# Patient Record
Sex: MALE | Race: WHITE | Employment: FULL TIME | URBAN - METROPOLITAN AREA
[De-identification: names, ages, dates, MRNs, and addresses within clinical notes are randomized per-mention and may not be internally consistent; named-entity substitution may affect disease eponyms.]

---

## 2018-01-17 ENCOUNTER — HOSPITAL ENCOUNTER (OUTPATIENT)
Dept: PHYSICAL THERAPY | Age: 53
Discharge: HOME | End: 2018-01-17
Payer: COMMERCIAL

## 2018-01-17 PROCEDURE — 97110 THERAPEUTIC EXERCISES: CPT

## 2018-01-17 PROCEDURE — 97161 PT EVAL LOW COMPLEX 20 MIN: CPT

## 2018-01-17 NOTE — PROGRESS NOTES
[facility]  [address] [address], [address] [place] [address], [address] [place] [address]  Phone: [phone]  Fax: [phone]    Plan of Care/Statement of Necessity for Physical Therapy Services  2-15    Patient name: Beltran Andres  : 1965  Provider#: [id number]  Referral source: David Yost,*      Medical/Treatment Diagnosis: Left foot pain [M79.672]     Prior Hospitalization: see medical history     Comorbidities: none noted  Prior Level of Function: works full-time in [employer]; completes 20 minutes of exercise at least 3x/week; runner  Medications: Verified on Patient Summary List    Start of Care: 2018     Onset Date: 2017       The Plan of Care and following information is based on the information from the initial evaluation. Assessment/ key information: Pt is a 50year old male who is referred to Physical Therapy by Dr. Roberto Serrato with a diagnosis of left plantar fasciitis. Pain started due to overuse from running in 2017. Pt instructed in lace locking for his running shoes to provide increased stability of calcaneus. Patient will benefit from skilled PT services to modify and progress therapeutic interventions, address functional mobility deficits, address ROM deficits, address strength deficits, analyze and address soft tissue restrictions, analyze and cue movement patterns, analyze and modify body mechanics/ergonomics, assess and modify postural abnormalities and address imbalance to attain pt/PT goals.     Evaluation Complexity History MEDIUM  Complexity : 1-2 comorbidities / personal factors will impact the outcome/ POC ; Examination HIGH Complexity : 4+ Standardized tests and measures addressing body structure, function, activity limitation and / or participation in recreation  ;Presentation LOW Complexity : Stable, uncomplicated  ;Clinical Decision Making MEDIUM Complexity : FOTO score of 26-74  Overall Complexity Rating: LOW     Problem List: pain affecting function, decrease ROM, decrease strength, impaired gait/ balance, decrease ADL/ functional abilitiies, decrease activity tolerance and decrease flexibility/ joint mobility   Treatment Plan may include any combination of the following: Therapeutic exercise, Therapeutic activities, Neuromuscular re-education, Physical agent/modality, Gait/balance training, Manual therapy and Patient education  Patient / Family readiness to learn indicated by: asking questions, trying to perform skills and interest  Persons(s) to be included in education: patient (P)  Barriers to Learning/Limitations: None  Patient Goal (s): Eliminate issue.   Patient Self Reported Health Status: good  Rehabilitation Potential: good    Short/Long Term Goals: To be accomplished in 6 weeks:  1) Pt will be Independent with HEP. 2) Pt will be able to navigate one flight of stairs without pain. 3) Pt will be able to jog >/=5 miles without increase in pain. 4) Pt will report improvement in overall functional mobility, as measured by FOTO, with an increased score of at least 15 points, from 66 to 81. Frequency / Duration: Patient to be seen 2 times per week for 6 weeks. Patient/ Caregiver education and instruction: self care, activity modification and exercises    [x]  Plan of care has been reviewed with KATHY Nam PT, DPT   1/17/2018 9:57 AM    ________________________________________________________________________    I certify that the above Therapy Services are being furnished while the patient is under my care. I agree with the treatment plan and certify that this therapy is necessary.     [facility] Signature:____________________  Date:____________Time: _________

## 2018-01-17 NOTE — PROGRESS NOTES
PT INITIAL EVALUATION NOTE - [facility] 2-15    Patient Name: Arnol Ríos  Date:2018  : 1965  [x]  Patient  Verified  Payor: Juan Diego Avendañoight / Plan: [employer] HMO / Product Type: HMO /    In time:10:00am  Out time:11:00pm  Total Treatment Time (min): 60  Total Timed Codes (min): 20  1:1 Treatment Time ( [address] only): --   Visit #: 1     Treatment Area: Left foot pain [M79.672]    SUBJECTIVE  Pain Level (0-10 scale): 0/10  Any medication changes, allergies to medications, adverse drug reactions, diagnosis change, or new procedure performed?: [] No    [x] Yes (see summary sheet for update)  Subjective:    Left heel pain started in 2017 when running. Had previous episode about 10 years ago   10K training program   Last run was on Saturday. Since pain started in December, he has limited his running to <5 miles, 3-4x/week  LBP- related to mountain biking a few years ago. Pt ran [place] last year and had flare up of bilateral hip pain. PLOF: no left heel pain  Mechanism of Injury: running  Previous Treatment/Compliance: ice massage, golf ball massage, rest, NSAID  PMHx/Surgical Hx: none noted  Work Hx: works full-time for [email or web address] in [address] [place] [address] Situation: lives with wife  Pt Goals: \"Eliminate issue. \"  Barriers: overuse  Motivation: high  Substance use: none  FABQ Score: low        OBJECTIVE/EXAMINATION  Posture:  Weight shifted to the right in standing  Gait and Functional Mobility:  Slight toe out during swing phase on the left  Palpation: tenderness to palpation left calcaneus    Left ankle ROM: AROM   PROM   DF  42   45   PF  48   50   IR/Sup/Ev 35   35   ER/Pron/Inv 20   23    Joint Mobility Assessment: left hypomobility posterior talus glide    Flexibility: tightness in left piriformis, gastrocnemius and soleus muscles    LOWER QUARTER   MUSCLE STRENGTH  KEY       R  L  0 - No Contraction  Knee ext  5/5  5/5  1 - Trace            flex  5/5  5/5  2 - Poor   Hip ext   5/5  5/5  3 - Fair          flex   5/5  5/5  4 - Good         abd  4/5  3+/5  5 - Normal         add  5/5  5/5      Ankle DF  5/5  5/5                PF  5/5  5/5                INV  5/5  5/5                EV  5/5  5/5    Neurological: Reflexes / Sensations: normal  Special Tests: Windlass, non-weightbearing: +     20 min Therapeutic Exercise:  [x] See flow sheet :   Rationale: increase ROM, increase strength and improve coordination to improve the patients ability to return to running without pain or limitation          With   [] TE   [] TA   [] neuro   [] other: Patient Education: [x] Review HEP- pt given handout with exercises for HEP    [] Progressed/Changed HEP based on:   [] positioning   [] body mechanics   [] transfers   [] heat/ice application    [x] other: pt instructed in lock lacing of running shoes      Other Objective/Functional Measures: FOTO: 66    Pain Level (0-10 scale) post treatment: 6/10    ASSESSMENT/Changes in Function:   Pt is a 50year old male who is referred to Physical Therapy by Dr. Alex Stanford with a diagnosis of left plantar fasciitis. Pain started due to overuse from running in December 2017. Pt instructed in lace locking for his running shoes to provide increased stability of calcaneus. Patient will benefit from skilled PT services to modify and progress therapeutic interventions, address functional mobility deficits, address ROM deficits, address strength deficits, analyze and address soft tissue restrictions, analyze and cue movement patterns, analyze and modify body mechanics/ergonomics, assess and modify postural abnormalities and address imbalance to attain pt/PT goals.     [x]  See Plan of [address] PT, DPT   1/17/2018  9:56 AM

## 2018-01-23 ENCOUNTER — HOSPITAL ENCOUNTER (OUTPATIENT)
Dept: PHYSICAL THERAPY | Age: 53
Discharge: HOME | End: 2018-01-23
Payer: COMMERCIAL

## 2018-01-23 PROCEDURE — 97035 APP MDLTY 1+ULTRASOUND EA 15: CPT | Performed by: PHYSICAL THERAPIST

## 2018-01-23 PROCEDURE — 97110 THERAPEUTIC EXERCISES: CPT | Performed by: PHYSICAL THERAPIST

## 2018-01-23 PROCEDURE — 97140 MANUAL THERAPY 1/> REGIONS: CPT | Performed by: PHYSICAL THERAPIST

## 2018-01-23 NOTE — PROGRESS NOTES
PT DAILY TREATMENT NOTE 2-15    Patient Name: Roxi Forbes  Date:2018  : 1965  [x]  Patient  Verified  Payor: Julius Lerma / Plan: [employer] / Product Type: HMO /    In time:8:00a  Out time: 9:15a  Total Treatment Time (min):75  Visit #: 2     Treatment Area: Left foot pain [M79.002]    SUBJECTIVE  Pain Level (0-10 scale): 4/10  Any medication changes, allergies to medications, adverse drug reactions, diagnosis change, or new procedure performed?: [x] No    [] Yes (see summary sheet for update)  Subjective functional status/changes:   [] No changes reported  Feeling about the same. OBJECTIVE    Modality rationale: decrease inflammation, decrease pain and increase tissue extensibility to improve the patients ability to return to running painfree.     Min Type Additional Details    [] Estim: []Att   []Unatt        []TENS instruct                  []IFC  []Premod   []NMES                     []Other:  []w/US   []w/ice   []w/heat  Position:  Location:    []  Traction: [] Cervical       []Lumbar                       [] Prone          []Supine                       []Intermittent   []Continuous Lbs:  [] before manual  [] after manual  []w/heat   8 [x]  Ultrasound: []Continuous   [x] Pulsed at:                            []1MHz   [x]3MHz Location: calcaneus  W/cm2:1.5    []  Paraffin         Location:  []w/heat   To go []  Ice     []  Heat  []  Ice massage Position:  Location:    []  Laser  []  Other: Position:  Location:    []  Vasopneumatic Device Pressure:       [] lo [] med [] hi   Temperature:    [x] Skin assessment post-treatment:  [x]intact []redness- no adverse reaction    []redness - adverse reaction:   30 min Therapeutic Exercise:  [x] See flow sheet :   Rationale: increase ROM, increase strength and improve coordination to improve the patients ability to return to running without pain or limitation        30 min Manual Therapy:  talocrural joint mobilizations, great toe mobilizations and passive stretching, STM to plantar fascia and gastroc soleus. Used IASTM on distal gastroc soleus and achilles. Rationale: decrease pain, increase ROM, increase tissue extensibility and decrease trigger points  to improve the patients ability to  return to running without pain or limitation      With   [] TE   [] TA   [] neuro   [] other: Patient Education: [x] Review HEP    [] Progressed/Changed HEP based on:   [] positioning   [] body mechanics   [] transfers   [] heat/ice application    [] other:      Other Objective/Functional Measures: DF 4 degrees on the left and 5 on the right  Hallux rigidis LUIS. Poor functional control with single leg squat with  Pelvic drop and medial collapse at knee. Soleus assessment 4 inches on the left and 4.5 inches on the right. Pain Level (0-10 scale) post treatment: not reported    ASSESSMENT/Changes in Function:   Very tight into gastroc and had blanching of skin immediately with IASTM distal gastroc along musculotendinous junction. Discussed stretching several times per day and icing as well. Patient will continue to benefit from skilled PT services to modify and progress therapeutic interventions, address functional mobility deficits, address ROM deficits, address strength deficits, analyze and address soft tissue restrictions, analyze and cue movement patterns, analyze and modify body mechanics/ergonomics and assess and modify postural abnormalities to attain remaining goals. [x]  See Plan of Care  []  See progress note/recertification  []  See Discharge Summary         Progress towards goals / Updated goals:  Short/Long Term Goals: To be accomplished in 6 weeks:  1) Pt will be Independent with HEP. 2) Pt will be able to navigate one flight of stairs without pain. 3) Pt will be able to jog >/=5 miles without increase in pain.   4) Pt will report improvement in overall functional mobility, as measured by FOTO, with an increased score of at least 15 points, from 66 to 81.     Frequency / Duration: Patient to be seen 2 times per week for 6 weeks.     PLAN  []  Upgrade activities as tolerated     [x]  Continue plan of care  []  Update interventions per flow sheet       []  Discharge due to:_  []  Other:_      Alisa Guerrero 1/23/2018  9:44 AM

## 2018-01-25 ENCOUNTER — HOSPITAL ENCOUNTER (OUTPATIENT)
Dept: PHYSICAL THERAPY | Age: 53
Discharge: HOME | End: 2018-01-25
Payer: COMMERCIAL

## 2018-01-25 PROCEDURE — 97140 MANUAL THERAPY 1/> REGIONS: CPT | Performed by: PHYSICAL THERAPIST

## 2018-01-25 PROCEDURE — 97110 THERAPEUTIC EXERCISES: CPT | Performed by: PHYSICAL THERAPIST

## 2018-01-25 PROCEDURE — 97035 APP MDLTY 1+ULTRASOUND EA 15: CPT | Performed by: PHYSICAL THERAPIST

## 2018-01-25 NOTE — PROGRESS NOTES
PT DAILY TREATMENT NOTE 2-15    Patient Name: Alla Murrieta  Date:2018  : 1965  [x]  Patient  Verified  Payor: Matthiaskaz Rosasas / Plan: [employer] / Product Type: HMO /    In time:7:30a  Out time:8:30a  Total Treatment Time (min): 60  Visit #: 3    Treatment Area: Left foot pain [M79.232]    SUBJECTIVE  Pain Level (0-10 scale): 1/10  Any medication changes, allergies to medications, adverse drug reactions, diagnosis change, or new procedure performed?: [x] No    [] Yes (see summary sheet for update)  Subjective functional status/changes:   [] No changes reported  Doing pretty well. OBJECTIVE           Modality rationale: decrease inflammation, decrease pain and increase tissue extensibility to improve the patients ability to return to running painfree.     Min Type Additional Details     [] Estim: []Att   []Unatt        []TENS instruct                  []IFC  []Premod   []NMES                     []Other:  []w/US   []w/ice   []w/heat  Position:  Location:     []  Traction: [] Cervical       []Lumbar                       [] Prone          []Supine                       []Intermittent   []Continuous Lbs:  [] before manual  [] after manual  []w/heat   8 [x]  Ultrasound: []Continuous   [x] Pulsed at:                            []1MHz   [x]3MHz Location: calcaneus  W/cm2:1.5     []  Paraffin     Location:  []w/heat   To go []  Ice     []  Heat  []  Ice massage Position:  Location:     []  Laser  []  Other: Position:  Location:     []  Vasopneumatic Device Pressure:       [] lo [] med [] hi   Temperature:    [x] Skin assessment post-treatment:  [x]intact []redness- no adverse reaction    []redness - adverse reaction:   30 min Therapeutic Exercise:  [x] See flow sheet :   Rationale: increase ROM, increase strength and improve coordination to improve the patients ability to return to running without pain or limitation         20 min Manual Therapy:  talocrural joint mobilizations, great toe mobilizations and passive stretching, STM to plantar fascia and gastroc soleus. Used IASTM on distal gastroc soleus and achilles. Rationale: decrease pain, increase ROM, increase tissue extensibility and decrease trigger points  to improve the patients ability to  return to running without pain or limitation        With   [] TE   [] TA   [] neuro   [] other: Patient Education: [x] Review HEP    [] Progressed/Changed HEP based on:   [] positioning   [] body mechanics   [] transfers   [] heat/ice application    [] other:       Other Objective/Functional Measures: DF 4 degrees on the left and 5 on the right  Hallux rigidis LUIS. Poor functional control with single leg squat with  Pelvic drop and medial collapse at knee. Soleus assessment 4 inches on the left and 4.5 inches on the right. Pain Level (0-10 scale) post treatment: not reported     ASSESSMENT/Changes in Function:   Overall feeling a little improvement in s/s since last session. Did tape again and feel like this is helping some. Less turgor along distal achilles and calf today  Patient will continue to benefit from skilled PT services to modify and progress therapeutic interventions, address functional mobility deficits, address ROM deficits, address strength deficits, analyze and address soft tissue restrictions, analyze and cue movement patterns, analyze and modify body mechanics/ergonomics and assess and modify postural abnormalities to attain remaining goals.      [x]  See Plan of Care  []  See progress note/recertification  []  See Discharge Summary      Progress towards goals / Updated goals:  Short/Long Term Goals: To be accomplished in 6 weeks:  1) Pt will be Independent with HEP. 2) Pt will be able to navigate one flight of stairs without pain. 3) Pt will be able to jog >/=5 miles without increase in pain.   4) Pt will report improvement in overall functional mobility, as measured by FOTO, with an increased score of at least 15 points, from 66 to 80.      Frequency / Duration: Patient to be seen 2 times per week for 6 weeks.     PLAN  []  Upgrade activities as tolerated     [x]  Continue plan of care  []  Update interventions per flow sheet       []  Discharge due to:_  []  Other:_      Lisa Middleton 1/25/2018  10:28 AM

## 2018-01-30 ENCOUNTER — HOSPITAL ENCOUNTER (OUTPATIENT)
Dept: PHYSICAL THERAPY | Age: 53
Discharge: HOME | End: 2018-01-30
Payer: COMMERCIAL

## 2018-01-30 PROCEDURE — 97140 MANUAL THERAPY 1/> REGIONS: CPT | Performed by: PHYSICAL THERAPIST

## 2018-01-30 PROCEDURE — 97110 THERAPEUTIC EXERCISES: CPT | Performed by: PHYSICAL THERAPIST

## 2018-01-30 PROCEDURE — 97035 APP MDLTY 1+ULTRASOUND EA 15: CPT | Performed by: PHYSICAL THERAPIST

## 2018-01-30 NOTE — PROGRESS NOTES
PT DAILY TREATMENT NOTE 2-15    Patient Name: Barry Schroeder  Date:2018  : 1965  [x]  Patient  Verified  Payor: Elizabeth Yas / Plan: [employer] / Product Type: HMO /    In time:7:00a  Out time:8:00a  Total Treatment Time (min): 60  Visit #: 4     Treatment Area: Left foot pain [M79.552]    SUBJECTIVE  Pain Level (0-10 scale): 2/10  Any medication changes, allergies to medications, adverse drug reactions, diagnosis change, or new procedure performed?: [x] No    [] Yes (see summary sheet for update)  Subjective functional status/changes:   [] No changes reported  Feeling  more sore this weekend doing projects around the house. OBJECTIVE                Modality rationale: decrease inflammation, decrease pain and increase tissue extensibility to improve the patients ability to return to running painfree.     Min Type Additional Details      [] Estim: []Att   []Unatt        []TENS instruct                  []IFC  []Premod   []NMES                     []Other:  []w/US   []w/ice   []w/heat  Position:  Location:      []  Traction: [] Cervical       []Lumbar                       [] Prone          []Supine                       []Intermittent   []Continuous Lbs:  [] before manual  [] after manual  []w/heat   8 [x]  Ultrasound: []Continuous   [x] Pulsed at:                            []1MHz   [x]3MHz Location: calcaneus  W/cm2:1.5      []  Paraffin     Location:  []w/heat   To go []  Ice     []  Heat  []  Ice massage Position:  Location:      []  Laser  []  Other: Position:  Location:      []  Vasopneumatic Device Pressure:       [] lo [] med [] hi   Temperature:    [x] Skin assessment post-treatment:  [x]intact []redness- no adverse reaction    []redness - adverse reaction:   30 min Therapeutic Exercise:  [x] See flow sheet :   Rationale: increase ROM, increase strength and improve coordination to improve the patients ability to return to running without pain or limitation          20 min Manual Therapy:  talocrural joint mobilizations, great toe mobilizations and passive stretching, STM to plantar fascia and gastroc soleus. Used IASTM on distal gastroc soleus and achilles. Rationale: decrease pain, increase ROM, increase tissue extensibility and decrease trigger points  to improve the patients ability to  return to running without pain or limitation          With   [] TE   [] TA   [] neuro   [] other: Patient Education: [x] Review HEP    [] Progressed/Changed HEP based on:   [] positioning   [] body mechanics   [] transfers   [] heat/ice application    [] other:        Other Objective/Functional Measures: DF 4 degrees on the left and 5 on the right  Hallux rigidis LUIS. Poor functional control with single leg squat with  Pelvic drop and medial collapse at knee. Soleus assessment 4 inches on the left and 4.5 inches on the right.      Pain Level (0-10 scale) post treatment: not reported      ASSESSMENT/Changes in Function:   Feeling a little sore after being up on his feet doing projects around the house all weekend. Patient will continue to benefit from skilled PT services to modify and progress therapeutic interventions, address functional mobility deficits, address ROM deficits, address strength deficits, analyze and address soft tissue restrictions, analyze and cue movement patterns, analyze and modify body mechanics/ergonomics and assess and modify postural abnormalities to attain remaining goals.      [x]  See Plan of Care  []  See progress note/recertification  []  See Discharge Summary      Progress towards goals / Updated goals:  Short/Long Term Goals: To be accomplished in 6 weeks:  1) Pt will be Independent with HEP. 2) Pt will be able to navigate one flight of stairs without pain. 3) Pt will be able to jog >/=5 miles without increase in pain.   4) Pt will report improvement in overall functional mobility, as measured by FOTO, with an increased score of at least 15 points, from 66 to 80.      Frequency / Duration: Patient to be seen 2 times per week for 6 weeks.      PLAN  []  Upgrade activities as tolerated     [x]  Continue plan of care  []  Update interventions per flow sheet       []  Discharge due to:_  []  Other:_      Brendan Peng 1/30/2018  1:46 PM

## 2018-02-01 ENCOUNTER — HOSPITAL ENCOUNTER (OUTPATIENT)
Dept: PHYSICAL THERAPY | Age: 53
Discharge: HOME | End: 2018-02-01
Payer: COMMERCIAL

## 2018-02-01 PROCEDURE — 97140 MANUAL THERAPY 1/> REGIONS: CPT | Performed by: PHYSICAL THERAPIST

## 2018-02-01 PROCEDURE — 97035 APP MDLTY 1+ULTRASOUND EA 15: CPT | Performed by: PHYSICAL THERAPIST

## 2018-02-01 PROCEDURE — 97110 THERAPEUTIC EXERCISES: CPT | Performed by: PHYSICAL THERAPIST

## 2018-02-01 NOTE — PROGRESS NOTES
PT DAILY TREATMENT NOTE 2-15    Patient Name: Caesar Guadalupe  Date:2018  : 1965  [x]  Patient  Verified  Payor: Chavo La / Plan: [employer] / Product Type: HMO /    In time: 7:30a  Out time:8:45a  Total Treatment Time (min): 75  Visit #:5     Treatment Area: Left foot pain [M79.672]    SUBJECTIVE  Pain Level (0-10 scale): 2/10  Any medication changes, allergies to medications, adverse drug reactions, diagnosis change, or new procedure performed?: [x] No    [] Yes (see summary sheet for update)  Subjective functional status/changes:   [] No changes reported  Using the sock  The past 2 nights and feels like it is helping some. OBJECTIVE                    Modality rationale: decrease inflammation, decrease pain and increase tissue extensibility to improve the patients ability to return to running painfree.     Min Type Additional Details      [] Estim: []Att   []Unatt        []TENS instruct                  []IFC  []Premod   []NMES                     []Other:  []w/US   []w/ice   []w/heat  Position:  Location:      []  Traction: [] Cervical       []Lumbar                       [] Prone          []Supine                       []Intermittent   []Continuous Lbs:  [] before manual  [] after manual  []w/heat   8 [x]  Ultrasound: []Continuous   [x] Pulsed at:                            []1MHz   [x]3MHz Location: calcaneus  W/cm2:1.5      []  Paraffin     Location:  []w/heat   To go []  Ice     []  Heat  []  Ice massage Position:  Location:      []  Laser  []  Other: Position:  Location:      []  Vasopneumatic Device Pressure:       [] lo [] med [] hi   Temperature:    [x] Skin assessment post-treatment:  [x]intact []redness- no adverse reaction    []redness - adverse reaction:   45 min Therapeutic Exercise:  [x] See flow sheet :   Rationale: increase ROM, increase strength and improve coordination to improve the patients ability to return to running without pain or limitation          20 min Manual Therapy:  talocrural joint mobilizations, great toe mobilizations and passive stretching, STM to plantar fascia and gastroc soleus. Used IASTM on distal gastroc soleus and achilles. Rationale: decrease pain, increase ROM, increase tissue extensibility and decrease trigger points  to improve the patients ability to  return to running without pain or limitation          With   [] TE   [] TA   [] neuro   [] other: Patient Education: [x] Review HEP    [] Progressed/Changed HEP based on:   [] positioning   [] body mechanics   [] transfers   [] heat/ice application    [] other:        Other Objective/Functional Measures: DF 15 on the left        Pain Level (0-10 scale) post treatment: not reported      ASSESSMENT/Changes in Function:   Pt feeling like he is having more good days now. Still has days he feels his pain more. Patient will continue to benefit from skilled PT services to modify and progress therapeutic interventions, address functional mobility deficits, address ROM deficits, address strength deficits, analyze and address soft tissue restrictions, analyze and cue movement patterns, analyze and modify body mechanics/ergonomics and assess and modify postural abnormalities to attain remaining goals.      [x]  See Plan of Care  []  See progress note/recertification  []  See Discharge Summary      Progress towards goals / Updated goals:  Short/Long Term Goals: To be accomplished in 6 weeks:  1) Pt will be Independent with HEP. 2) Pt will be able to navigate one flight of stairs without pain. 3) Pt will be able to jog >/=5 miles without increase in pain.   4) Pt will report improvement in overall functional mobility, as measured by FOTO, with an increased score of at least 15 points, from 66 to 81.      Frequency / Duration: Patient to be seen 2 times per week for 6 weeks.      PLAN  []  Upgrade activities as tolerated     [x]  Continue plan of care  []  Update interventions per flow sheet       []  Discharge due to:_  []  Other:_      Monique Quest 2/1/2018  11:02 AM

## 2018-02-06 ENCOUNTER — HOSPITAL ENCOUNTER (OUTPATIENT)
Dept: PHYSICAL THERAPY | Age: 53
Discharge: HOME | End: 2018-02-06
Payer: COMMERCIAL

## 2018-02-06 PROCEDURE — 97110 THERAPEUTIC EXERCISES: CPT | Performed by: PHYSICAL THERAPIST

## 2018-02-06 PROCEDURE — 97140 MANUAL THERAPY 1/> REGIONS: CPT | Performed by: PHYSICAL THERAPIST

## 2018-02-06 PROCEDURE — 97035 APP MDLTY 1+ULTRASOUND EA 15: CPT | Performed by: PHYSICAL THERAPIST

## 2018-02-06 NOTE — PROGRESS NOTES
PT DAILY TREATMENT NOTE 2-15    Patient Name: Carla Araujo  Date:2018  : 1965  [x]  Patient  Verified  Payor: [employer] / Plan: [employer] / Product Type: HMO /    In time:7:00a Out time:8:00a  Total Treatment Time (min): 60  Visit #: 6     Treatment Area: Left foot pain [M79.282]    SUBJECTIVE  Pain Level (0-10 scale): 0/10  Any medication changes, allergies to medications, adverse drug reactions, diagnosis change, or new procedure performed?: [x] No    [] Yes (see summary sheet for update)  Subjective functional status/changes:   [] No changes reported  Had the flu over the weekend. OBJECTIVE                    Modality rationale: decrease inflammation, decrease pain and increase tissue extensibility to improve the patients ability to return to running painfree.     Min Type Additional Details      [] Estim: []Att   []Unatt        []TENS instruct                  []IFC  []Premod   []NMES                     []Other:  []w/US   []w/ice   []w/heat  Position:  Location:      []  Traction: [] Cervical       []Lumbar                       [] Prone          []Supine                       []Intermittent   []Continuous Lbs:  [] before manual  [] after manual  []w/heat   8 [x]  Ultrasound: []Continuous   [x] Pulsed at:                            []1MHz   [x]3MHz Location: calcaneus  W/cm2:1.5      []  Paraffin     Location:  []w/heat   To go []  Ice     []  Heat  []  Ice massage Position:  Location:      []  Laser  []  Other: Position:  Location:      []  Vasopneumatic Device Pressure:       [] lo [] med [] hi   Temperature:    [x] Skin assessment post-treatment:  [x]intact []redness- no adverse reaction    []redness - adverse reaction:   30 min Therapeutic Exercise:  [x] See flow sheet :   Rationale: increase ROM, increase strength and improve coordination to improve the patients ability to return to running without pain or limitation          20 min Manual Therapy:  talocrural joint mobilizations, great toe mobilizations and passive stretching, STM to plantar fascia and gastroc soleus. Used IASTM on distal gastroc soleus and achilles. Rationale: decrease pain, increase ROM, increase tissue extensibility and decrease trigger points  to improve the patients ability to  return to running without pain or limitation          With   [] TE   [] TA   [] neuro   [] other: Patient Education: [x] Review HEP    [] Progressed/Changed HEP based on:   [] positioning   [] body mechanics   [] transfers   [] heat/ice application    [] other:        Other Objective/Functional Measures: DF 15 on the left         Pain Level (0-10 scale) post treatment: not reported      ASSESSMENT/Changes in Function:   Pt getting over the flu, feeling weak still. Felt better doing the manual at the end. Patient will continue to benefit from skilled PT services to modify and progress therapeutic interventions, address functional mobility deficits, address ROM deficits, address strength deficits, analyze and address soft tissue restrictions, analyze and cue movement patterns, analyze and modify body mechanics/ergonomics and assess and modify postural abnormalities to attain remaining goals.      [x]  See Plan of Care  []  See progress note/recertification  []  See Discharge Summary      Progress towards goals / Updated goals:  Short/Long Term Goals: To be accomplished in 6 weeks:  1) Pt will be Independent with HEP. 2) Pt will be able to navigate one flight of stairs without pain. 3) Pt will be able to jog >/=5 miles without increase in pain.   4) Pt will report improvement in overall functional mobility, as measured by FOTO, with an increased score of at least 15 points, from 66 to 81.      Frequency / Duration: Patient to be seen 2 times per week for 6 weeks.      PLAN    []  Upgrade activities as tolerated     [x]  Continue plan of care  []  Update interventions per flow sheet       []  Discharge due to:_  []  Other:_      Nay Bernard 2/6/2018  11:05 AM

## 2018-02-08 ENCOUNTER — HOSPITAL ENCOUNTER (OUTPATIENT)
Dept: PHYSICAL THERAPY | Age: 53
Discharge: HOME | End: 2018-02-08
Payer: COMMERCIAL

## 2018-02-08 PROCEDURE — 97110 THERAPEUTIC EXERCISES: CPT | Performed by: PHYSICAL THERAPIST

## 2018-02-08 PROCEDURE — 97140 MANUAL THERAPY 1/> REGIONS: CPT | Performed by: PHYSICAL THERAPIST

## 2018-02-08 NOTE — PROGRESS NOTES
PT DAILY TREATMENT NOTE 2-15    Patient Name: Joby Pagan  Date:2018  : 1965  [x]  Patient  Verified  Payor: Joseph [employer] / Plan: [employer] / Product Type: HMO /    In time: 7:30a Out time:8:30a  Total Treatment Time (min): 60  Visit #:7    Treatment Area: Left foot pain [M79.902]    SUBJECTIVE  Pain Level (0-10 scale): 0/10  Any medication changes, allergies to medications, adverse drug reactions, diagnosis change, or new procedure performed?: [x] No    [] Yes (see summary sheet for update)  Subjective functional status/changes:   [] No changes reported  A little sore, but no pain    OBJECTIVE                    Modality rationale: decrease inflammation, decrease pain and increase tissue extensibility to improve the patients ability to return to running painfree.     Min Type Additional Details      [] Estim: []Att   []Unatt        []TENS instruct                  []IFC  []Premod   []NMES                     []Other:  []w/US   []w/ice   []w/heat  Position:  Location:      []  Traction: [] Cervical       []Lumbar                       [] Prone          []Supine                       []Intermittent   []Continuous Lbs:  [] before manual  [] after manual  []w/heat   5 [x]  Ultrasound: []Continuous   [x] Pulsed at:                            []1MHz   [x]3MHz Location: calcaneus  W/cm2:1.5      []  Paraffin     Location:  []w/heat   To go []  Ice     []  Heat  []  Ice massage Position:  Location:      []  Laser  []  Other: Position:  Location:      []  Vasopneumatic Device Pressure:       [] lo [] med [] hi   Temperature:    [x] Skin assessment post-treatment:  [x]intact []redness- no adverse reaction    []redness - adverse reaction:   30 min Therapeutic Exercise:  [x] See flow sheet :   Rationale: increase ROM, increase strength and improve coordination to improve the patients ability to return to running without pain or limitation          10 min Manual Therapy:  talocrural joint mobilizations, great toe mobilizations and passive stretching, STM to plantar fascia and gastroc soleus. Used IASTM on distal gastroc soleus and achilles. Rationale: decrease pain, increase ROM, increase tissue extensibility and decrease trigger points  to improve the patients ability to  return to running without pain or limitation          With   [] TE   [] TA   [] neuro   [] other: Patient Education: [x] Review HEP    [] Progressed/Changed HEP based on:   [] positioning   [] body mechanics   [] transfers   [] heat/ice application    [] other:        Other Objective/Functional Measures: DF 15 on the left          Pain Level (0-10 scale) post treatment: not reported      ASSESSMENT/Changes in Function:   Pt was taped with Elastikon today to determine how he feels with this. Allowing to go for a small run if he feels good this weekend. Patient will continue to benefit from skilled PT services to modify and progress therapeutic interventions, address functional mobility deficits, address ROM deficits, address strength deficits, analyze and address soft tissue restrictions, analyze and cue movement patterns, analyze and modify body mechanics/ergonomics and assess and modify postural abnormalities to attain remaining goals.      [x]  See Plan of Care  []  See progress note/recertification  []  See Discharge Summary      Progress towards goals / Updated goals:  Short/Long Term Goals: To be accomplished in 6 weeks:  1) Pt will be Independent with HEP. 2) Pt will be able to navigate one flight of stairs without pain. 3) Pt will be able to jog >/=5 miles without increase in pain.   4) Pt will report improvement in overall functional mobility, as measured by FOTO, with an increased score of at least 15 points, from 66 to 81.      Frequency / Duration: Patient to be seen 2 times per week for 6 weeks.      PLAN     []  Upgrade activities as tolerated     [x]  Continue plan of care  []  Update interventions per flow sheet       []  Discharge due to:_  []  Other:_      Rose Varghese 2/8/2018  9:01 AM

## 2018-02-13 ENCOUNTER — HOSPITAL ENCOUNTER (OUTPATIENT)
Dept: PHYSICAL THERAPY | Age: 53
Discharge: HOME | End: 2018-02-13
Payer: COMMERCIAL

## 2018-02-13 PROCEDURE — 97110 THERAPEUTIC EXERCISES: CPT | Performed by: PHYSICAL THERAPIST

## 2018-02-13 PROCEDURE — 97035 APP MDLTY 1+ULTRASOUND EA 15: CPT | Performed by: PHYSICAL THERAPIST

## 2018-02-13 PROCEDURE — 97140 MANUAL THERAPY 1/> REGIONS: CPT | Performed by: PHYSICAL THERAPIST

## 2018-02-16 ENCOUNTER — HOSPITAL ENCOUNTER (OUTPATIENT)
Dept: PHYSICAL THERAPY | Age: 53
Discharge: HOME | End: 2018-02-16
Payer: COMMERCIAL

## 2018-02-16 PROCEDURE — 97140 MANUAL THERAPY 1/> REGIONS: CPT | Performed by: PHYSICAL THERAPIST

## 2018-02-16 PROCEDURE — 97110 THERAPEUTIC EXERCISES: CPT | Performed by: PHYSICAL THERAPIST

## 2018-02-16 NOTE — PROGRESS NOTES
PT DAILY TREATMENT NOTE 2-15    Patient Name: Caesar Guadalupe  Date:2018  : 1965  [x]  Patient  Verified  Payor: [employer] / Plan: [address]MO / Product Type: HMO /    In time:7:20a Out time:8:30  Total Treatment Time (min): 70  Visit #: 9    Treatment Area: Left foot pain [M70.092]    SUBJECTIVE  Pain Level (0-10 scale): 3/10  Any medication changes, allergies to medications, adverse drug reactions, diagnosis change, or new procedure performed?: [x] No    [] Yes (see summary sheet for update)  Subjective functional status/changes:   [] No changes reported  Pt has been sore the past few days, tried to run 2.5 miles on Wednesday and has hurt ever since    OBJECTIVE                    Modality rationale: decrease inflammation, decrease pain and increase tissue extensibility to improve the patients ability to return to running painfree.     Min Type Additional Details      [] Estim: []Att   []Unatt        []TENS instruct                  []IFC  []Premod   []NMES                     []Other:  []w/US   []w/ice   []w/heat  Position:  Location:      []  Traction: [] Cervical       []Lumbar                       [] Prone          []Supine                       []Intermittent   []Continuous Lbs:  [] before manual  [] after manual  []w/heat   - [x]  Ultrasound: []Continuous   [x] Pulsed at:                            []1MHz   [x]3MHz Location: calcaneus  W/cm2:1.5      []  Paraffin     Location:  []w/heat   To go []  Ice     []  Heat  []  Ice massage Position:  Location:      []  Laser  []  Other: Position:  Location:      []  Vasopneumatic Device Pressure:       [] lo [] med [] hi   Temperature:    [x] Skin assessment post-treatment:  [x]intact []redness- no adverse reaction    []redness - adverse reaction:   10 min Therapeutic Exercise:  [x] See flow sheet :   Rationale: increase ROM, increase strength and improve coordination to improve the patients ability to return to running without pain or limitation          30 min Manual Therapy:  talocrural joint mobilizations, great toe mobilizations and passive stretching, STM to plantar fascia and gastroc soleus. Used IASTM on distal gastroc soleus and achilles. Rationale: decrease pain, increase ROM, increase tissue extensibility and decrease trigger points  to improve the patients ability to  return to running without pain or limitation          With   [] TE   [] TA   [] neuro   [] other: Patient Education: [x] Review HEP    [] Progressed/Changed HEP based on:   [] positioning   [] body mechanics   [] transfers   [] heat/ice application    [] other:        Other Objective/Functional Measures: DF 15 on the left          Pain Level (0-10 scale) post treatment: not reported      ASSESSMENT/Changes in Function:   Pt referred back to Dr Margaret Menchaca for further eval. Making progress but unable to run pain free. Patient will continue to benefit from skilled PT services to modify and progress therapeutic interventions, address functional mobility deficits, address ROM deficits, address strength deficits, analyze and address soft tissue restrictions, analyze and cue movement patterns, analyze and modify body mechanics/ergonomics and assess and modify postural abnormalities to attain remaining goals.      [x]  See Plan of Care  []  See progress note/recertification  []  See Discharge Summary      Progress towards goals / Updated goals:    Short/Long Term Goals: To be accomplished in 6 weeks:  1) Pt will be Independent with HEP. 2) Pt will be able to navigate one flight of stairs without pain. 3) Pt will be able to jog >/=5 miles without increase in pain.   4) Pt will report improvement in overall functional mobility, as measured by FOTO, with an increased score of at least 15 points, from 66 to 81.      Frequency / Duration: Patient to be seen 2 times per week for 6 weeks.      PLAN      []  Upgrade activities as tolerated     [x]  Continue plan of care  []  Update interventions per flow sheet       []  Discharge due to:_  []  Other:_        Chance Schilling 2/16/2018  7:26 AM

## 2018-04-25 NOTE — ANCILLARY DISCHARGE INSTRUCTIONS
[facility]  [address], [address] [place] [address]  Phone: [phone]  Fax: [phone]    Discharge Summary  2-15    Patient name: Yennifer Clemens  : 1965  Provider#: [id number]  Referral source: Morro Samano,*      Medical/Treatment Diagnosis: Left foot pain [M79.672]     Prior Hospitalization: see medical history     Comorbidities: -  Prior Level of Function:running  Medications: Verified on Patient Summary List    Start of Care:18      Onset Date:2017  Visits from Start of Care:  9     Missed Visits: -  Reporting Period : 18 to 18    Short/Long Term Goals: To be accomplished in 6 weeks:  1) Pt will be Independent with HEP. MET  2) Pt will be able to navigate one flight of stairs without pain. MET  3) Pt will be able to jog >/=5 miles without increase in pain. NOT MET  4) Pt will report improvement in overall functional mobility, as measured by FOTO, with an increased score of at least 15 points, from 66 to 81. ASSESSMENT/SUMMARY OF CARE: The pt was seen for 9 sessions in PT and made progress in overall pain levels, flexibility, balance and strength however still reported pain with running.  He was referred back to MD.     RECOMMENDATIONS:  [x]Discontinue therapy: []Patient has reached or is progressing toward set goals      []Patient is non-compliant or has abdicated      [x]Due to lack of appreciable progress towards set goals    Khoa Garcia 2018 2:06 PM out of season (available sept 1 thru apr 2 only)

## 2022-09-28 ENCOUNTER — TELEPHONE (OUTPATIENT)
Dept: RHEUMATOLOGY | Age: 57
End: 2022-09-28

## 2022-09-28 NOTE — TELEPHONE ENCOUNTER
Received referral, called and left a vm stating to return call to schedule for the next available with Dr. Adele Moore in January or February per [facility].

## 2022-11-15 ENCOUNTER — TELEPHONE (OUTPATIENT)
Dept: RHEUMATOLOGY | Age: 57
End: 2022-11-15

## 2022-11-15 NOTE — TELEPHONE ENCOUNTER
Pt called to cancel NPT appt due to finding another rheumatologist. Cancelled appt. Pt is fully aware and understood.

## 2024-03-20 ENCOUNTER — HOSPITAL ENCOUNTER (OUTPATIENT)
Facility: HOSPITAL | Age: 59
Discharge: HOME | End: 2024-03-23
Payer: COMMERCIAL

## 2024-03-20 DIAGNOSIS — M54.16 LUMBAR RADICULOPATHY: ICD-10-CM

## 2024-03-20 DIAGNOSIS — M54.50 SPINE PAIN, LUMBAR: ICD-10-CM

## 2024-03-20 DIAGNOSIS — R29.898 RIGHT LEG WEAKNESS: ICD-10-CM

## 2024-03-20 PROCEDURE — 72148 MRI LUMBAR SPINE W/O DYE: CPT
